# Patient Record
Sex: MALE | Race: BLACK OR AFRICAN AMERICAN | Employment: OTHER | URBAN - NONMETROPOLITAN AREA
[De-identification: names, ages, dates, MRNs, and addresses within clinical notes are randomized per-mention and may not be internally consistent; named-entity substitution may affect disease eponyms.]

---

## 2017-01-24 ENCOUNTER — TELEPHONE (OUTPATIENT)
Dept: PHYSICAL MEDICINE AND REHAB | Age: 42
End: 2017-01-24

## 2020-09-02 ENCOUNTER — HOSPITAL ENCOUNTER (EMERGENCY)
Age: 45
Discharge: HOME | End: 2020-09-02
Attending: EMERGENCY MEDICINE
Payer: MEDICARE

## 2020-09-02 VITALS
RESPIRATION RATE: 16 BRPM | HEART RATE: 80 BPM | DIASTOLIC BLOOD PRESSURE: 93 MMHG | OXYGEN SATURATION: 96 % | SYSTOLIC BLOOD PRESSURE: 140 MMHG

## 2020-09-02 LAB
EKG ATRIAL RATE: 83 BPM
EKG P AXIS: 45 DEGREES
EKG P-R INTERVAL: 178 MS
EKG Q-T INTERVAL: 370 MS
EKG QRS DURATION: 78 MS
EKG QTC CALCULATION (BAZETT): 434 MS
EKG R AXIS: 44 DEGREES
EKG T AXIS: 27 DEGREES
EKG VENTRICULAR RATE: 83 BPM

## 2020-09-02 PROCEDURE — 93005 ELECTROCARDIOGRAM TRACING: CPT | Performed by: EMERGENCY MEDICINE

## 2020-09-02 PROCEDURE — 93010 ELECTROCARDIOGRAM REPORT: CPT | Performed by: INTERNAL MEDICINE

## 2020-09-02 PROCEDURE — 99282 EMERGENCY DEPT VISIT SF MDM: CPT

## 2020-09-02 NOTE — ED PROVIDER NOTES
[facility] EMERGENCY DEPT      CHIEF COMPLAINT       Chief Complaint   Patient presents with    Other     Feelings of being unable to breath or wake up when sleeping       Nurses Notes reviewed and I agree except as noted in the HPI. HISTORY OF PRESENT ILLNESS    Marialuisa Pruitt is a 50 y.o. male who presents complaint of unable to move/feel like he cannot breathe while at the same time laying awake. Patient state that he has had this symptoms since he was a child. He was concerned today because it symptoms lasted longer than usual.  He denies any fever chills, no belly pain, no chest pain. No recent trauma to head neck. Patient state that he has no numbness tingling his face arms legs, no focal weakness either. Nothing seems to particularly make the symptoms worse or better, he thinks that may be drinking prior to going to bed make the symptoms    REVIEW OF SYSTEMS      Review of Systems   Constitutional: Negative for fever, chills, diaphoresis and fatigue. HENT: Negative for congestion, drooling, facial swelling and sore throat. Eyes: Negative for photophobia, pain and discharge. Respiratory: Negative for cough, shortness of breath, wheezing and stridor. Cardiovascular: Negative for chest pain, palpitations and leg swelling. Gastrointestinal: Negative for abdominal pain, blood in stool and abdominal distention. Genitourinary: Negative for dysuria, urgency, hematuria and difficulty urinating. Musculoskeletal: Negative for gait problem, neck pain and neck stiffness. Skin; No rash, No itching  Neurological: Negative for seizures, weakness and numbness. Psychiatric/Behavioral: Negative for hallucinations, confusion and agitation. PAST MEDICAL HISTORY    has a past medical history of Seizure ([facility] [address] SURGICAL HISTORY      has no past surgical history on file.     CURRENT MEDICATIONS       Previous Medications    PHENYTOIN (DILANTIN) 100 MG ER CAPSULE    Take 100 mg by mouth 5 times daily        ALLERGIES     has No Known Allergies. FAMILY HISTORY     He indicated that his mother is . He indicated that his father is . family history includes Diabetes in his mother. SOCIAL HISTORY      reports that he has never smoked. He does not have any smokeless tobacco history on file. He reports current alcohol use of about 2.0 standard drinks of alcohol per week. He reports that he does not use drugs. PHYSICAL EXAM     INITIAL VITALS:  blood pressure is 140/93 (abnormal) and his pulse is 80. His respiration is 16 and oxygen saturation is 96%. Physical Exam   Constitutional:  well-developed and well-nourished. HENT: Head: Normocephalic, atraumatic, Bilateral external ears normal, Oropharynx mosit, No oral exudates, Nose normal.   Eyes: PERRL, EOMI, Conjunctiva normal, No discharge. No scleral icterus  Neck: Normal range of motion, No tenderness, Supple    Cardiovascular: Normal rate, regular rhythm, S1 normal and S2 normal.  Exam reveals no gallop. Pulmonary/Chest: Effort normal and breath sounds normal. No accessory muscle usage or stridor. No respiratory distress. no wheezes. has no rales. exhibits no tenderness. Abdominal: Soft. Bowel sounds are normal.  exhibits no distension. There is no tenderness. There is no rebound and no guarding. Extremities: No edema, no tenderness, no cyanosis, no clubbing. Musculoskeletal: Good range of motion in major joints is observed. No major deformities noted. Neurological: Alert and oriented ×3, normal motor function, normal sensory function, no focal deficits. GCS  15  Skin: Skin is warm, dry and intact. No rash noted. No erythema.    Psychiatric: Affect normal, judgment normal, mood normal.  DIFFERENTIAL DIAGNOSIS:       DIAGNOSTIC RESULTS     EKG: All EKG's are interpreted by the Emergency Department Physician who either signs or Co-signs this chart in the absence of a cardiologist.      RADIOLOGY: non-plain film images(s) such as CT, Ultrasound and MRI are read by the radiologist.  Plain radiographic images are visualized and preliminarily interpreted by the emergency physician unless otherwise stated below. LABS:   Labs Reviewed - No data to display    EMERGENCY DEPARTMENT COURSE:   Vitals:    Vitals:    09/02/20 0704 09/02/20 0713   BP:  (!) 140/93   Pulse: 77 80   Resp:  16   TempSrc: Oral    SpO2: 97% 96%     Patient presenting with complaint of sleep-related paralysis, chronic in nature. Patient advised to avoid excessive alcohol intake prior to going to bed, use of sleep medicine. Otherwise, he can follow-up with primary care physician. CRITICAL CARE:       CONSULTS:  None    PROCEDURES:  None    FINAL IMPRESSION      1. Sleep paralysis          DISPOSITION/PLAN       PATIENT REFERRED TO:  No follow-up provider specified.     DISCHARGE MEDICATIONS:  New Prescriptions    No medications on file       (Please note that portions of this note were completed with a voice recognition program.  Efforts were made to edit the dictations but occasionally words are mis-transcribed.)    Lisa Meadows, [address] [place] [address],   09/02/20 0377

## 2021-02-19 NOTE — PROGRESS NOTES
[facility] [address] initial consultation note    Ida Morel                                                Chief complaint: Ida Morel is a 48 y. o.oldmale came for further evaluation regarding his ?sleep apnea  with referral from MOMO Myers - NP. [place]:    Sleep/Wake schedule:  Usual time to go to bed during the work/regular day of week: {NUMBERS 1-12:10}:{HPI TIME MINUTES :20212} {AM/PM:160067966}. Usual time to wake up during the work//regular day of week: {NUMBERS 1-12:10}:{HPI TIME MINUTES :20212} {YY/ST:509429871}. Over the weekends his sleep schedule: [] Remain same. []phase delayed. He usually falls a sleep in less than: {NUMBERS BY 5:43950} minutes. He takes naps: {YES/NO:19726}. Number of naps per week:  {Numbers; 1-10:57654} times. During each nap he spends a total of: {NUMBERS 0-10:85211}hours/   {NUMBERS BY 5:70888}  Minutes. The naps were reported as refreshing: {YES/NO:19726}. Sleep Hygiene:    Is the temperature and evironment in his bed room is acceptable to him: Yes. He watches Television in his bed room: {YES/NO:19726}. He read books, study, pay bills etc in the bed: {YES/NO:19726}. Frequency He wake up during night/sleep: {Numbers; 1-10:26528}  Majority of nocturnal awakenings are for urination: {YES/NO:19726}. Difficulty in falling back to sleep after nocturnal awakenings: {YES/NO:19726}  . Do you drink coffee: {YES/NO:19726}. {NUMBERS 0-10:76952} cup/s per day. Do you drink caffeinated beverages i.e sodas: {YES/NO:19726}. {NUMBERS 0-10:67662} can/s per day. Do you drink tea:{YES/NO:19726}. {NUMBERS 0-10:60593} cup/s/glasse/s per day. Do you drink alcoholic beverages: {FKJ/CN:87061}. {NUMBERS 0-10:88904} drink/s per day. History of recreational drug use: {YES/NO:19726}. History of tobacco smoking:{YES/NO:19726}. Amount of tobacco smoking: {NUMBERS; 0.25-3 BY 0.25:59524} PPD. Years of tobacco smoking: {Numbers; 0-100:57246}. Quit smoking: {YES/NO:19726}. Quit year:{YES/NO:19726}. Current smoker: {YES/NO:19726}. Amount of current tobacco smoking: {NUMBERS; 0.25-3 BY 0.25:62588} PPD      Sleep apnea symptoms:  Noticed to have loud snoring:{YES/NO:19726}. Noted by his family member- {Persons; family members:607551}  Witnessed apneas during sleep noticed: {YES/NO:19726}. Noted by his family member- {Persons; family members:549527}. History of choking and gasping sensation at night time: {YES/NO:19726}. History of headaches in the morning:{YES/NO:19726}. History of dry mouth in the morning: {YES/NO:19726}. History of palpitations during night time/nocturnal awakenings: {YES/NO:19726}. History of sweating during night time/nocturnal awakenings: {YES/NO:19726}    General:  History of head injury in the past: {YES/NO:19726}. []  Due to MVA  [] Not due to MVA. Associated loss of consciousness with head injury: {YES/NO:19726}. History of seizures: {YES/NO:19726}. Rest less legs syndrome symptoms:NO  History suggestive of periodic limb movements during sleep: NO  History suggestive of hypnagogic hallucinations: NO  History suggestive of hypnopompic hallucinations: NO  History suggestive of sleep talking:NO  History suggestive of sleep walking:NO  History suggestive of bruxism: {YES/NO:19726}. [] No mouth guard. [] Uses mouth guard. History suggestive of cataplexy: NO  History suggestive of sleep paralysis: NO    Family history of sleep disorders:  Family history of obstructive sleep apnea: {YES/NO:19726}. Family member diagnosed with obstructive sleep apnea {Persons; family members:725489}. Family member using PAP therapy:  {YES/NO:19726}. Family history of Narcolepsy: {YES/NO:19726}. Family member diagnosed with Narcolepsy {Persons; family members:287331}. Family history of Rest less legs syndrome : {YES/NO:19726}. Family member diagnosed with Restless legs syndrome {Persons; family members:710016}. History regarding old sleep studies:  Prior history of sleep study: {YES/NO:19726}. Using CPAP device: {YES/NO:19726}. Currently using home Oxygen: NO.    {NUMBERS 0-10:89239}LPM.    Patient considerations:  Is the patient is ambulatory: Yes  Patient is currently using: None of these Wheelchair, Lonita Teto or [employer]. Para/Quadriplegic: NO  Hearing deficit : NO  Claustrophobic: NO  MDD : NO  Blind: NO  Incontinent: NO  Para/Quadraplegi: NO.   Need transportation to and from Sleep Center:NO      Social History:  Social History     Tobacco Use    Smoking status: Never Smoker   Substance Use Topics    Alcohol use: Yes     Alcohol/week: 2.0 standard drinks     Types: 2 Cans of beer per week    Drug use: No   .  He is currently working: {YES/NO:19726}. [] Retired. []Disabled     He usually goes to his work at:  {NUMBERS 1-12:10}:{HPI TIME MINUTES :20212}{AM/PM:605601875}. He completes his work at:  {NUMBERS 1-12:10}:{HPI TIME MINUTES :20212}{AM/PM:430997466}. Past Medical History:   Diagnosis Date    Seizure [facility])        No past surgical history on file. No Known Allergies    Current Outpatient Medications   Medication Sig Dispense Refill    phenytoin (DILANTIN) 100 MG ER capsule Take 100 mg by mouth 5 times daily        No current facility-administered medications for this visit. Family History   Problem Relation Age of Onset    Diabetes Mother         Review of Systems:   General/Constitutional: No recent loss of weight or appetite changes. No fever or chills. HENT: Negative. Eyes: Negative. Upper respiratory tract: No nasal stuffiness or post nasal drip. Lower respiratory tract/ lungs: No cough or sputum production. No hemoptysis. Cardiovascular: No palpitations or chest pain. Gastrointestinal: No nausea or vomiting. Neurological: No focal neurologiacal weakness. Extremities: No edema. Musculoskeletal: No complaints. Genitourinary: No complaints. Hematological: Negative. Psychiatric/Behavioral: Negative. Skin: No itching. There were no vitals taken for this visit. Mallampati Score: {NUMBERS 1-4:13359}   Neck Circumference:{NUMBERS 0-31:12768}. {NUMBERS; 0-9:49657} Inches. His [place] sleepiness score given today was : {NUMBERS 0-31:13377}      Physical Exam   Nursing note and vitals reviewed. Constitutional: Patient appears moderately built and moderately nourished. No distress. Patient is oriented to person, place, and time. HENT:   Head: Normocephalic and atraumatic. Right Ear: External ear normal.   Left Ear: External ear normal.   Mouth/Throat: Oropharynx is clear and moist.  No oral thrush. Eyes: Conjunctivae are normal. Pupils are equal, round, and reactive to light. No scleral icterus. Neck: Neck supple. No JVD present. No tracheal deviation present. Cardiovascular: Normal rate, regular rhythm, normal heart sounds. No murmur heard. Pulmonary/Chest: Effort normal and breath sounds normal. No stridor. No respiratory distress. No wheezes. No rales. Patient exhibits no tenderness. Abdominal: Soft. Patient exhibits no distension. No tenderness. Musculoskeletal: Normal range of motion. Extremities: Patient exhibits no edema and no tenderness. Lymphadenopathy:  No cervical adenopathy. Neurological: Patient is alert and oriented to person, place, and time. Skin: Skin is warm and dry. Patient is not diaphoretic. Psychiatric: Patient  has a normal mood and affect. Patient behavior is normal.     Diagnostic Data:  None related sleep. Assessment:  -Snoring {Desc; with/without:5700} witnessed apneas,frequent nocturnal awakenings and excessive daytime sleepiness to evaluate for obstructive sleep apnea. -Inadequate sleep hygiene. Past Medical History:   Diagnosis Date    [facility]izure [facility])        Recommendations/Plan:  -Will schedule patient for polysomnogram in the sleep lab. -I had a discussion with patient regarding avialable treatment options for his sleep disorder breathing including but not limited to CPAP titration in the sleep lab Vs.Dental appliance placement with referral to a local dentist Vs other available surgical options including Uvulopalatopharyngoplasty, maxillomandibular ostomy and tracheostomy as last option. At the end of discussion, he is not decided on his   treatment if he found to have obstructive sleep apnea at this time.  -We will see David Douglas back in 1week after the sleep study to go over the sleep study results and further management options.  -He was educated to practice good sleep hygiene practices. He  was provided with a good sleep hygiene hand out.  -Serina Rodriguez was advised to make earlier appointment with my clinic if he develops any worsening of sleep symptoms. He verbalizes understanding.  -Serina Rodriguez was advised to not to drive any motor vehicles or operate heavy equipment until his sleep symptoms are under good control. David Douglas verbalizes understanding.  -He was advised to loose weight by controlling diet and doing exercise once cleared by his family physician. - David Douglas was educated about my impression and plan. He verbalizes understanding.      -I personally reviewed updated the Past medical hx, Past surgical hx,Social hx, Family hx, Medications, Allergies in the discrete data section of the patient chart along with labs, Pulmonary medicine,Sleep medicine related, Pathological, Microbiological and Radiological investigations.

## 2021-02-22 ENCOUNTER — INITIAL CONSULT (OUTPATIENT)
Dept: PULMONOLOGY | Age: 46
End: 2021-02-22
Payer: MEDICARE

## 2021-02-22 VITALS
DIASTOLIC BLOOD PRESSURE: 78 MMHG | WEIGHT: 225.4 LBS | TEMPERATURE: 97.3 F | OXYGEN SATURATION: 98 % | HEART RATE: 69 BPM | HEIGHT: 73 IN | BODY MASS INDEX: 29.87 KG/M2 | SYSTOLIC BLOOD PRESSURE: 124 MMHG

## 2021-02-22 DIAGNOSIS — R06.83 LOUD SNORING: Primary | ICD-10-CM

## 2021-02-22 DIAGNOSIS — R06.81 APNEA: ICD-10-CM

## 2021-02-22 DIAGNOSIS — R40.0 DAYTIME SOMNOLENCE: ICD-10-CM

## 2021-02-22 DIAGNOSIS — G47.10 HYPERSOMNIA: ICD-10-CM

## 2021-02-22 DIAGNOSIS — R56.9 SEIZURE (HCC): ICD-10-CM

## 2021-02-22 PROCEDURE — G8484 FLU IMMUNIZE NO ADMIN: HCPCS | Performed by: INTERNAL MEDICINE

## 2021-02-22 PROCEDURE — G8427 DOCREV CUR MEDS BY ELIG CLIN: HCPCS | Performed by: INTERNAL MEDICINE

## 2021-02-22 PROCEDURE — G8419 CALC BMI OUT NRM PARAM NOF/U: HCPCS | Performed by: INTERNAL MEDICINE

## 2021-02-22 PROCEDURE — 99204 OFFICE O/P NEW MOD 45 MIN: CPT | Performed by: INTERNAL MEDICINE

## 2021-02-22 PROCEDURE — 1036F TOBACCO NON-USER: CPT | Performed by: INTERNAL MEDICINE

## 2021-02-22 NOTE — PROGRESS NOTES
[facility] [address] [facility] initial consultation note    Karin Mcelroy                                                Chief complaint: Karin Mcelroy is a 48 y. o.oldmale came for further evaluation regarding his sleep apnea  with referral from MOMO [place] - [place]. [place]:    Sleep/Wake schedule:  Usual time to go to bed during the work/regular day of week: 11:00 PM.  Usual time to wake up during the work//regular day of week: 8:00 AM.  Over the weekends his sleep schedule: [] Remain same. [x]phase delayed. He usually falls a sleep in less than: 5 minutes. He takes naps: Yes. Number of naps per week:  2 times. During each nap he spends a total of: 5-10 minutes  The naps were reported as refreshing: Yes. Sleep Hygiene:    Is the temperature and evironment in his bed room is acceptable to him: Yes. He watches Television in his bed room: Yes. He read books, study, pay bills etc in the bed: Yes. Frequency He wake up during night/sleep: 0  Majority of nocturnal awakenings are for urination:   Difficulty in falling back to sleep after nocturnal awakenings: No  .  Do you drink coffee: No.  cup/s per day. Do you drink caffeinated beverages i.e sodas: Yes. 1 can/s per day. Do you drink tea:Yes. 1 cup/s/glasse/s per day. Do you drink alcoholic beverages: Yes. 3 drink/s per day a few times a week     History of recreational drug use: No.     History of tobacco smoking:No.      Sleep apnea symptoms:  Noticed to have loud snoring:Yes. Noted by his family members  Witnessed apneas during sleep noticed: Yes. Noted by his family members. History of choking and gasping sensation at night time: Yes.   History of headaches in the morning:No.  History of dry mouth in the morning: No.  History of palpitations during night time/nocturnal awakenings: No.  History of sweating during night time/nocturnal awakenings: Yes    General: No current facility-administered medications for this visit. Family History   Problem Relation Age of Onset    Diabetes Mother         Review of Systems:   General/Constitutional: No recent loss of weight or appetite changes. No fever or chills. HENT: Negative. Eyes: Negative. Upper respiratory tract: No nasal stuffiness or post nasal drip. Lower respiratory tract/ lungs: No cough or sputum production. No hemoptysis. Cardiovascular: No palpitations or chest pain. Gastrointestinal: No nausea or vomiting. Neurological: No focal neurologiacal weakness. Extremities: No edema. Musculoskeletal: No complaints. Genitourinary: No complaints. Hematological: Negative. Psychiatric/Behavioral: Negative. Skin: No itching. /78 (Site: Right Upper Arm, Position: Sitting, Cuff Size: Medium Adult)   Pulse 69   Temp 97.3 °F (36.3 °C) (Temporal)   Ht 6' 1\" (1.854 m)   Wt 225 lb 6.4 oz (102.2 kg)   SpO2 98% Comment: Room air at rest  BMI 29.74 kg/m²   Mallampati Score: 3  Neck Circumference:  16.75 Inches. His [place] sleepiness score given today was: 13    Physical Exam   Nursing note and vitals reviewed. Constitutional: Patient appears moderately built and moderately nourished. No distress. Patient is oriented to person, place, and time. HENT:   Head: Normocephalic and atraumatic. Right Ear: External ear normal.   Left Ear: External ear normal.   Mouth/Throat: Oropharynx is clear and moist.  No oral thrush. Eyes: Conjunctivae are normal. Pupils are equal, round, and reactive to light. No scleral icterus. Neck: Neck supple. No JVD present. No tracheal deviation present. Cardiovascular: Normal rate, regular rhythm, normal heart sounds. No murmur heard. Pulmonary/Chest: Effort normal and breath sounds normal. No stridor. No respiratory distress. No wheezes. No rales. Patient exhibits no tenderness. Abdominal: Soft. Patient exhibits no distension. No tenderness. Musculoskeletal: Normal range of motion. Extremities: Patient exhibits no edema and no tenderness. Lymphadenopathy:  No cervical adenopathy. Neurological: Patient is alert and oriented to person, place, and time. Skin: Skin is warm and dry. Patient is not diaphoretic. Psychiatric: Patient  has a normal mood and affect. Patient behavior is normal.     Diagnostic Data:  None related sleep. Assessment:  -Snoring with witnessed apneas,frequent nocturnal awakenings and excessive daytime sleepiness to evaluate for obstructive sleep apnea. -Inadequate sleep hygiene. Past Medical History:   Diagnosis Date    Seizure [facility])        Recommendations/Plan:  -Will schedule patient for polysomnogram in the sleep lab. -I had a discussion with patient regarding avialable treatment options for his sleep disorder breathing including but not limited to CPAP titration in the sleep lab Vs.Dental appliance placement with referral to a local dentist Vs other available surgical options including Uvulopalatopharyngoplasty, maxillomandibular ostomy and tracheostomy as last option. At the end of discussion, he is not decided on his   treatment if he found to have obstructive sleep apnea at this time.  -We will see Kirstin Alas back in 1week after the sleep study to go over the sleep study results and further management options.  -He was educated to practice good sleep hygiene practices. He  was provided with a good sleep hygiene hand out.  -Duong Simpson was advised to make earlier appointment with my clinic if he develops any worsening of sleep symptoms. He verbalizes understanding.  -Duong Simpson was advised to not to drive any motor vehicles or operate heavy equipment until his sleep symptoms are under good control. Kirstin Alas verbalizes understanding.  -He was advised to loose weight by controlling diet and doing exercise once cleared by his family physician. - Malina Wade was educated about my impression and plan. He verbalizes understanding.      -I personally reviewed updated the Past medical hx, Past surgical hx,Social hx, Family hx, Medications, Allergies in the discrete data section of the patient chart along with labs, Pulmonary medicine,Sleep medicine related, Pathological, Microbiological and Radiological investigations.      Electronically signed by Lorin Regalado MD on 2/22/2021 at 9:58 AM

## 2021-02-22 NOTE — PATIENT INSTRUCTIONS
-Will schedule patient for polysomnogram in the sleep lab. -I had a discussion with patient regarding avialable treatment options for his sleep disorder breathing including but not limited to CPAP titration in the sleep lab Vs.Dental appliance placement with referral to a local dentist Vs other available surgical options including Uvulopalatopharyngoplasty, maxillomandibular ostomy and tracheostomy as last option. At the end of discussion, he is not decided on his   treatment if he found to have obstructive sleep apnea at this time.  -We will see Savannah Lima back in 1week after the sleep study to go over the sleep study results and further management options.  -He was educated to practice good sleep hygiene practices. He  was provided with a good sleep hygiene hand out.  -Davi Bocanegra was advised to make earlier appointment with my clinic if he develops any worsening of sleep symptoms. He verbalizes understanding.  -Ludinpasha Bocanegra was advised to not to drive any motor vehicles or operate heavy equipment until his sleep symptoms are under good control. Savannah Lima verbalizes understanding.  -He was advised to loose weight by controlling diet and doing exercise once cleared by his family physician. - Savannah Lima was educated about my impression and plan. He verbalizes understanding.

## 2021-02-22 NOTE — PROGRESS NOTES
Chief Complaint:  Johnny Taveras is here today for a new sleep consult. Mallampati airway Class:  2  Neck Circumference:  16.75 Inches    [place] sleepiness score 2/22/21:  ***.   SAQLI: ***

## 2021-03-15 ENCOUNTER — HOSPITAL ENCOUNTER (OUTPATIENT)
Dept: SLEEP CENTER | Age: 46
Discharge: HOME | End: 2021-03-17
Payer: MEDICARE

## 2021-03-15 DIAGNOSIS — R56.9 SEIZURE (HCC): ICD-10-CM

## 2021-03-15 DIAGNOSIS — G47.10 HYPERSOMNIA: ICD-10-CM

## 2021-03-15 DIAGNOSIS — R06.81 APNEA: ICD-10-CM

## 2021-03-15 DIAGNOSIS — R40.0 DAYTIME SOMNOLENCE: ICD-10-CM

## 2021-03-15 DIAGNOSIS — R06.83 LOUD SNORING: ICD-10-CM

## 2021-03-15 PROCEDURE — 95810 POLYSOM 6/> YRS 4/> PARAM: CPT

## 2021-03-16 LAB — STATUS: NORMAL

## 2021-03-17 NOTE — PROGRESS NOTES
[address] [place] [address]                               SLEEP STUDY REPORT    PATIENT NAME: Alexander Jaramillo                  :        1975  MED REC NO:   [id number]                           ROOM:  ACCOUNT NO:   [de-identified]                           ADMIT DATE: 03/15/2021  PROVIDER:     Marcial Johnson MD    DATE OF STUDY:  03/15/2021    REFERRING PROVIDER:  Neris Chowdary CNP    The patient's height is 73 inches, weight is 225 pounds with a BMI of  29.7. HISTORY:  The patient is a 78-year-old gentleman who was initially  evaluated by me on 2021. The patient is currently suffering from  hypersomnia with [place] Sleepiness Score of 13. The patient gave a  history of snoring and witnessed apneas. The patient is also having  frequent nocturnal awakenings. The patient was scheduled for sleep  study to evaluate for sleep apnea as an etiology for hypersomnia. METHODS:  The patient underwent digital polysomnography in compliance  with the standards and specifications from the AASM Manual including the  simultaneous recording of 3 EEG channels (F4-M1, C4-M1, and O2-M1 with  back up electrodes F3-M2, C3-M2, and O1-M2), 2 EOG channels (E1-M2, and  E2-M1,), EMG (chin, left & right leg), EKG, Nonin pulse oximetry with  less than 2 second averaging time, body position, airflow recorded by  oral-nasal thermal sensor and nasal air pressure transducer, plus  respiratory effort recorded by calibrated respiratory inductance  plethysmography (RIP), flow volume loop, sound and video. Sleep staging  and scoring followed the standard put forth by the American Academy of  Sleep Medicine and utilized the 4A obstructive hypopnea event  desaturation of 4 percent or greater. INTERPRETATION:  This is a baseline sleep study, and the study was  performed on 03/15/2021.   The study was started at 09:29 p.m. and was  terminated at 05:00 a.m. with a total recording time of 450.9 minutes,  sleep period time was 429.9 minutes, and total sleep time was 405.5  minutes. Overall sleep efficiency was 89.9%. The sleep onset latency  was 21 minutes, and wake after sleep onset was 24.4 minutes, and REM  sleep latency was 128 minutes. SLEEP STAGING AND DISTRIBUTION SUMMARY:  Revealed the patient spent 72  minutes in stage I consisting of 17.8%, 253.5 minutes in stage II  consisting of 62.5%, 11.5 minutes in stage III consisting of 2.8%, 68.5  minutes in REM sleep consisting of 16.9% of total sleep time. RESPIRATORY EVENT ANALYSIS:  Revealed the patient had a total of 23  apneas, all of them were obstructive in nature. The patient also had a  total of 93 hypopneas, all of them were obstructive in nature. The  total number of apneas and hypopneas recorded during the study was 116  with an apnea-hypopnea index of 17.2. The patient's REM sleep  apnea-hypopnea index was 18.4. POSITION ANALYSIS:  Revealed the patient spent 148.7 minutes in supine  position and 256.8 minutes in right lateral position with a supine  apnea-hypopnea index of 39.1 whereas right lateral position  apnea-hypopnea index was 4.2. PERIODIC LIMB MOVEMENT ANALYSIS:  Revealed the patient did not have any  periodic limb movements. The patient had a total of 104 spontaneous  arousals with a spontaneous arousal index of 15.4. OXYGEN SATURATION MONITORING:  Revealed the patient had a maximum oxygen  desaturation to 81% with a mean oxygen saturation of 94.6%. The patient  spent a total of 1.6 minutes below oxygen saturation less than 88%. The patient was found to have mild to loud snoring during the sleep  study. EKG MONITORING:  Revealed normal sinus rhythm with occasional premature  ventricular contractions. IMPRESSION:  1. Moderately severe obstructive sleep apnea with worsening of  respiratory events in REM sleep and also in supine position.   2.  Decreased amount of REM sleep. 3.  Hypersomnia, most likely due to sleep apnea. RECOMMENDATIONS:  The patient should be scheduled for followup in my  clinic as soon as possible to discuss about the sleep study findings for  further management. Thanks to [employer], CNP, for giving me this opportunity to  participate in the care of this pleasant gentleman.         Bonnie Cool MD    D: 03/16/2021 18:36:49       T: 03/17/2021 2:40:03     [email or web address]  Job#: [id number]     Doc#: [id number]    CC:

## 2021-03-29 ENCOUNTER — OFFICE VISIT (OUTPATIENT)
Dept: PULMONOLOGY | Age: 46
End: 2021-03-29
Payer: MEDICARE

## 2021-03-29 VITALS
WEIGHT: 223.8 LBS | OXYGEN SATURATION: 98 % | HEIGHT: 73 IN | TEMPERATURE: 96.5 F | HEART RATE: 75 BPM | BODY MASS INDEX: 29.66 KG/M2 | DIASTOLIC BLOOD PRESSURE: 74 MMHG | SYSTOLIC BLOOD PRESSURE: 126 MMHG

## 2021-03-29 DIAGNOSIS — G47.33 OSA (OBSTRUCTIVE SLEEP APNEA): Primary | ICD-10-CM

## 2021-03-29 DIAGNOSIS — G47.8 SLEEP PARALYSIS: ICD-10-CM

## 2021-03-29 DIAGNOSIS — R40.0 DAYTIME SOMNOLENCE: ICD-10-CM

## 2021-03-29 DIAGNOSIS — R06.83 LOUD SNORING: ICD-10-CM

## 2021-03-29 DIAGNOSIS — G47.10 HYPERSOMNIA: ICD-10-CM

## 2021-03-29 PROCEDURE — G8419 CALC BMI OUT NRM PARAM NOF/U: HCPCS | Performed by: PHYSICIAN ASSISTANT

## 2021-03-29 PROCEDURE — 1036F TOBACCO NON-USER: CPT | Performed by: PHYSICIAN ASSISTANT

## 2021-03-29 PROCEDURE — G8484 FLU IMMUNIZE NO ADMIN: HCPCS | Performed by: PHYSICIAN ASSISTANT

## 2021-03-29 PROCEDURE — 99214 OFFICE O/P EST MOD 30 MIN: CPT | Performed by: PHYSICIAN ASSISTANT

## 2021-03-29 PROCEDURE — G8427 DOCREV CUR MEDS BY ELIG CLIN: HCPCS | Performed by: PHYSICIAN ASSISTANT

## 2021-03-29 ASSESSMENT — ENCOUNTER SYMPTOMS
DIARRHEA: 0
BACK PAIN: 0
EYES NEGATIVE: 1
ALLERGIC/IMMUNOLOGIC NEGATIVE: 1
STRIDOR: 0
WHEEZING: 0
NAUSEA: 0
COUGH: 0
CHEST TIGHTNESS: 0
SHORTNESS OF BREATH: 0

## 2021-03-29 NOTE — PROGRESS NOTES
[facility]    Tanvi Ramirez, 48 y.o.  [id number]      Pt of [facility]  Nurses Notes   Lizette Hollins is here today for a PSG follow up. Study Results  Initial Study Date -  3/15/2021  AHI -  17.2    TotalEvents - 116  (Apneas  23  Hypopneas 93  Central  0)  LM w/Arousals - 0  Sleep Efficiency - 89.9 % (Total Sleep Time - 405.5 min)  Time with Sats below 88% - 1.6 min  Interval History       Tanvi Ramirez is a 48 y.o. old male who comes in to review the results of his recent sleep study, to answer questions and to explore options for treatment. he continues to have symptoms of snoring, excessive daytime sleepiness, take naps during the day    PMHx  Past Medical History:   Diagnosis Date    Seizure [facility])      No past surgical history on file. Social History     Tobacco Use    Smoking status: Never Smoker    Smokeless tobacco: Never Used   Substance Use Topics    Alcohol use: Yes     Alcohol/week: 2.0 standard drinks     Types: 2 Cans of beer per week    Drug use: No     Family History   Problem Relation Age of Onset    Diabetes Mother      Allergies  No Known Allergies  Meds  Current Outpatient Medications   Medication Sig Dispense Refill    phenytoin (DILANTIN) 100 MG ER capsule Take 100 mg by mouth 5 times daily        No current facility-administered medications for this visit. ROS  Review of Systems   Constitutional: Negative for activity change, appetite change, chills and fever. HENT: Negative for congestion and postnasal drip. Eyes: Negative. Respiratory: Negative for cough, chest tightness, shortness of breath, wheezing and stridor. Cardiovascular: Negative for chest pain and leg swelling. Gastrointestinal: Negative for diarrhea and nausea. Endocrine: Negative. Genitourinary: Negative. Musculoskeletal: Negative. Negative for arthralgias and back pain. Skin: Negative. Allergic/Immunologic: Negative. Neurological: Negative. Negative for dizziness and light-headedness. Psychiatric/Behavioral: Negative. All other systems reviewed and are negative. Exam  Vitals -  /74 (Site: Left Upper Arm, Position: Sitting, Cuff Size: Medium Adult)   Pulse 75   Temp 96.5 °F (35.8 °C) (Temporal)   Ht 6' 1\" (1.854 m)   Wt 223 lb 12.8 oz (101.5 kg)   SpO2 98% Comment: Room air at rest  BMI 29.53 kg/m²    Body mass index is 29.53 kg/m². Oxygen level - Room Air  Physical Exam  Constitutional:       Appearance: He is well-developed. HENT:      Head: Normocephalic and atraumatic. Right Ear: External ear normal.      Left Ear: External ear normal.   Eyes:      Conjunctiva/sclera: Conjunctivae normal.      Pupils: Pupils are equal, round, and reactive to light. Neck:      Musculoskeletal: Normal range of motion and neck supple. Cardiovascular:      Rate and Rhythm: Normal rate and regular rhythm. Heart sounds: Normal heart sounds. Pulmonary:      Effort: Pulmonary effort is normal.      Breath sounds: Normal breath sounds. Musculoskeletal: Normal range of motion. Skin:     General: Skin is warm and dry. Neurological:      Mental Status: He is alert and oriented to person, place, and time. Psychiatric:         Behavior: Behavior normal.         Thought Content: Thought content normal.         Judgment: Judgment normal.        Assessment   Diagnosis Orders   1. CAITY (obstructive sleep apnea)  Sleep Study with PAP Titration   2. Loud snoring     3. Daytime somnolence     4. Hypersomnia     5. Sleep paralysis        Recommendations  PSG positive for sleep apnea  Several options for treatment were discussed including positional therapy, OAT and CPAP. The benefits and limitations of each were discussed. After addressing his  concerns, Davi Bocanegra  decided to move ahead with a CPAP titration. Davi Bocanegra  also was interested trying out some masks before the study.      Schedule for a CPAP Titration   Mask desensitization Follow up 6-8 weeks after PAP set up        Kassandra Cruz PA-C, MPAS  3/29/2021

## 2021-05-05 ENCOUNTER — HOSPITAL ENCOUNTER (OUTPATIENT)
Dept: SLEEP CENTER | Age: 46
Discharge: HOME | End: 2021-05-07
Payer: MEDICARE

## 2021-05-05 DIAGNOSIS — G47.33 OSA (OBSTRUCTIVE SLEEP APNEA): ICD-10-CM

## 2021-05-05 PROCEDURE — 95811 POLYSOM 6/>YRS CPAP 4/> PARM: CPT

## 2021-05-06 DIAGNOSIS — G47.10 HYPERSOMNIA: ICD-10-CM

## 2021-05-06 DIAGNOSIS — G47.33 OSA (OBSTRUCTIVE SLEEP APNEA): Primary | ICD-10-CM

## 2021-05-06 DIAGNOSIS — Z99.89 OSA ON CPAP: ICD-10-CM

## 2021-05-06 DIAGNOSIS — G47.33 OSA ON CPAP: ICD-10-CM

## 2021-05-06 LAB — STATUS: NORMAL

## 2021-05-06 NOTE — PROGRESS NOTES
Micaela Feldmanvaldez doesn't have a preference of DME at this time. Title:  CPAP/BiLevel Titration's    Approved by:  Merlin Cabot MD      Approval Date:  December, 2019 Next Review:  December, 2021     Responsible Party:  [facility]idel[facility]entes Institution/Entities Applies to:   [place] Number:  None    Document Type:  Such as Guideline, Policy, Policy & Procedure, or Procedure, Instructions  Manual:  Policy and Procedures    Section: IV Policy Start Date: October, 1277           POLICY: Positive airway pressure device is used to treat patients with sleep related breathing disorders characterized by full or partial occlusion of the upper airway during sleep. A patient must have undergone polysomnography and diagnosed with obstructive sleep apnea. All individuals who record sleep studies must follow best practices for CPAP/bilevel/ASV titrations in order to attain the ideal pressure setting for their patients. Too low of pressure may cause patients to either be sub-optimally treated or to wake up in a panic. Too much pressure may cause the patient to experience bloating or mask leakage. Determining the appropriate pressure setting for each patient will lead to improved adherence and outcome. Titrations are not an exact science, and it is understood that technologists may need to make minor changes for individual patients. The procedures outlined below are meant to be a guideline and follow the spirit of the AASM clinical guidelines. PROCEDURE:    CPAP:    1. Review the patients pertinent medical al history, previous sleep study or studies to ass the severity of sleep disordered breathing. Review of pertinent information will help to attain a better titration.    2. Applications of electrodes, montages, filters, sensitivities and scoring will be performed according to the current version of the AASM Scoring Manual.   3. Prior to initiating study collect all breathing despite use of a full-face mask/chin strap  b. Inability to exhale against higher expiratory pressures (typically beginning anywhere from 15 to 20 cm of H20.  c. Has frequent central apneas, the use of bilevel positive airway pressure may be indicated. Document directly on study why the patient is being switched from CPAP to bilevel. 12. Ensure that supine sleep has been seen on the chosen setting. Going above the chosen setting 1 or 2 cm H20 to show range may be helpful to ensure that the correct pressure has been established. BiLEVEL:    1. Technologist may change from CPAP to bilevel during a study if proven the patient is unable to tolerate CPAP. 2. Review the patients pertinent medical al history, previous sleep study or studies to ass the severity of sleep disordered breathing. Review of pertinent information will help to attain a better titration. 3. Applications of electrodes, montages, filters, sensitivities and scoring will be performed according to the current version of the AASM Scoring Manual.   4. Prior to initiating study collect all appropriate PAP supplies  a. Tubing   b. Humidifier (filled with distilled water)  c. Masks   5. The technologist should assess and measure patient for most appropriate mask prior to start of study. 6. If the patient has not previously been on CPAP, beginning pressures should be 8/4 cm H20 or higher if patient is morbidly obese or unable to fall asleep at lower pressures. If the patient has been previously successfully treated on CPAP start expiratory pressure at therapeutic setting and set inspiratory pressure 4 cm H20 higher. If advancing from CPAP to bilevel during a study expiratory pressure should be set at most successful CPAP setting and inspiratory pressure set 4 cm h20 higher. 7. The standard differential pressure utilized during bilevel titrations typically ranges from 3 to 5 cm H20, with 4 cm H20 being the most common.   Higher differential

## 2021-05-07 ENCOUNTER — TELEPHONE (OUTPATIENT)
Dept: SLEEP CENTER | Age: 46
End: 2021-05-07

## 2021-05-07 NOTE — PROGRESS NOTES
[address] [place] [address]                               SLEEP STUDY REPORT    PATIENT NAME: Inga Hall                  :        1975  MED REC NO:   [id number]                           ROOM:  ACCOUNT NO:   [de-identified]                           ADMIT DATE: 2021  PROVIDER:     Adenike Jones. MD Elizabeth    DATE OF STUDY:  2021    CPAP TITRATION STUDY REPORT    REFERRING PROVIDER:  Cassandra Lau PA-C    The patient's height is 73 inches, weight is 223 pounds with a BMI of  29.4. HISTORY:  The patient is a 75-year-old gentleman who underwent initial  baseline sleep study on 03/15/2021. The patient was diagnosed with  moderately severe obstructive sleep apnea with worsening of respiratory  events during REM sleep. The patient had associated comorbidities  including hypersomnia. The patient was scheduled for CPAP titration as  a treatment. METHODS:  The patient underwent digital polysomnography in compliance  with the standards and specifications from the AASM Manual including the  simultaneous recording of 3 EEG channels (F4-M1, C4-M1, and O2-M1 with  back up electrodes F3-M2, C3-M2, and O1-M2), 2 EOG channels (E1-M2, and  E2-M1,), EMG (chin, left & right leg), EKG, Nonin pulse oximetry with  less than 2 second averaging time, body position, airflow recorded by  oral-nasal thermal sensor and nasal air pressure transducer, plus  respiratory effort recorded by calibrated respiratory inductance  plethysmography (RIP), flow volume loop, sound and video. Sleep staging  and scoring followed the standard put forth by the American Academy of  Sleep Medicine and utilized the 4A obstructive hypopnea event  desaturation of 4 percent or greater. INTERPRETATION:  This is a CPAP titration study, and the study was  performed on 2021.   The study was started at 09:00 p.m. and was  terminated at 05:09 a.m. with a total recording time of 488.6 minutes,  sleep period time was 432.1 minutes and total sleep time was 400.5  minutes. Overall sleep efficiency was 82%. The sleep onset latency was  56.4 minutes, wake after sleep onset was 31.6 minutes and REM sleep  latency was 39 minutes. SLEEP STAGING AND DISTRIBUTION SUMMARY:  Revealed the patient spent 57  minutes in stage I consisting of 14.2%, 249.5 minutes in stage II  consisting of 62.3%, 94 minutes in REM sleep consisting of 23.5% of  total sleep time. The patient had absent slow-wave sleep. CPAP TITRATION STUDY:  The CPAP titration study was started with a CPAP  pressure of 5 cm of water, and the CPAP pressure was gradually increased  to a CPAP pressure of 8 cm of water by titrating to apneas and  hypopneas. At a CPAP pressure of 8 cm of water, the patient spent 1  hour 11 minutes in bed. Out of 1 hour 11 minutes, the patient slept for  a period of 1 hour 5 minutes in non-REM sleep. At a CPAP pressure of 8  cm of water, the patient did not achieve REM sleep; however, the patient  achieved REM sleep at a CPAP pressure of 7 cm of water. At a CPAP  pressure of 8 cm of water, the patient was found to have a total of 2  central apneas and no hypopneas with an apnea-hypopnea index of 1.8. The maximum oxygen desaturation recorded at this pressure was 94% with a  mean oxygen saturation of 96.7%. At a CPAP pressure of 7 cm of water,  the patient spent 3 hours 3 minutes in bed. Out of 3 hours 3 minutes,  the patient slept for a period of 46.3 minutes in REM sleep, 2 hours 3  minutes in non-REM sleep. At a CPAP pressure of 7 cm of water, the  patient had a total of 1 central apnea event and 1 obstructive hypopnea  event with an apnea-hypopnea index of 0.7. The maximum oxygen  desaturation recorded at this pressure was 93% with a mean oxygen  saturation of 96.4%. PERIODIC LIMB MOVEMENT ANALYSIS:  Revealed the patient did not have any  periodic limb movements. The patient had a total of 65 spontaneous  arousals with a spontaneous arousal index of 9.7. EKG MONITORING:  Revealed normal sinus rhythm. IMPRESSION:  1. Moderately severe obstructive sleep apnea with worsening of  respiratory events in REM sleep. The patient had optimal titration to a  CPAP pressure of 7 cm of water with room air. 2.  Hypersomnia by history, most likely due to sleep apnea. 3.  The patient had a good amount of REM sleep during titration study  consistent with REM rebound phenomena. 4.  Bruxism noted during the titration. RECOMMENDATIONS:  1. For the patient's sleep-disordered breathing, we recommend starting  therapy with a CPAP pressure of 7 cm of water with room air. 2.  Specific recommendations include the patient's choice of interface  was DreamWear under the nose mask with a medium headgear and medium wide  cushion. 3.  The patient should be scheduled for a followup with Ms. Alee Gandhi' clinic in six to eight weeks. I recommended CPAP therapy for  clinical reevaluation with review of download. Thanks to Alee Gandhi PA-C, for giving me this opportunity to  participate in the care of this pleasant gentleman.         Navin Fernandes MD    D: 05/06/2021 18:00:23       T: 05/07/2021 5:17:45     [email or web address]  Job#: [id number]     Doc#: [id number]    CC:

## 2021-06-21 ENCOUNTER — TELEPHONE (OUTPATIENT)
Dept: PULMONOLOGY | Age: 46
End: 2021-06-21

## 2021-06-21 NOTE — TELEPHONE ENCOUNTER
I called patient since he no show appt for today, Which patient stated he get is machine on wed June 23rd.  I rescheduled him for Sept.

## 2021-09-09 ENCOUNTER — OFFICE VISIT (OUTPATIENT)
Dept: PULMONOLOGY | Age: 46
End: 2021-09-09
Payer: MEDICARE

## 2021-09-09 VITALS
BODY MASS INDEX: 30.4 KG/M2 | SYSTOLIC BLOOD PRESSURE: 126 MMHG | OXYGEN SATURATION: 99 % | DIASTOLIC BLOOD PRESSURE: 64 MMHG | HEART RATE: 84 BPM | WEIGHT: 229.4 LBS | TEMPERATURE: 97.6 F | HEIGHT: 73 IN

## 2021-09-09 DIAGNOSIS — G47.33 OSA ON CPAP: Primary | ICD-10-CM

## 2021-09-09 DIAGNOSIS — E66.9 OBESITY (BMI 30-39.9): ICD-10-CM

## 2021-09-09 DIAGNOSIS — Z99.89 OSA ON CPAP: Primary | ICD-10-CM

## 2021-09-09 PROCEDURE — 99213 OFFICE O/P EST LOW 20 MIN: CPT | Performed by: PHYSICIAN ASSISTANT

## 2021-09-09 PROCEDURE — 1036F TOBACCO NON-USER: CPT | Performed by: PHYSICIAN ASSISTANT

## 2021-09-09 PROCEDURE — G8427 DOCREV CUR MEDS BY ELIG CLIN: HCPCS | Performed by: PHYSICIAN ASSISTANT

## 2021-09-09 PROCEDURE — G8417 CALC BMI ABV UP PARAM F/U: HCPCS | Performed by: PHYSICIAN ASSISTANT

## 2021-09-09 ASSESSMENT — ENCOUNTER SYMPTOMS
WHEEZING: 0
SHORTNESS OF BREATH: 0
STRIDOR: 0
ALLERGIC/IMMUNOLOGIC NEGATIVE: 1
NAUSEA: 0
EYES NEGATIVE: 1
COUGH: 0
BACK PAIN: 0
CHEST TIGHTNESS: 0
DIARRHEA: 0

## 2021-09-09 NOTE — PROGRESS NOTES
[place] [facility]      Nasreen Stern         [id number]  9/9/2021   Chief Complaint   Patient presents with    Follow-up     CAITY follow up after set up with download         Pt of Dr. Dallas ROGERS Download:   Annie Piña or initial AHI: 17.2      Date of initial study: 3/15/2021      Compliant  27%     Noncompliant 37 %     PAP Type Airsense 10 autoset Level  7   Avg Hrs/Day 3 hr 56 min  AHI: 3.0   Recorded compliance dates , 8/10/2021  to 9/8/2021   Machine/Mfg:   [x] ResMed    [] Respironics/Dreamstation   Interface:   [] Nasal    [] Nasal pillows   [] FFM      Provider:      [] [facility]     [][facility]     [] [employer]    [] Normal    [] [place]               [] [facility]      [] Adaptive    [x] [address]      [] Other    Neck Size: 16.75  Mallampati Mallampati 3  ESS: 2   SAQLI: 97    Here is a scan of the most recent download:            Presentation:   Da Topete presents for sleep medicine follow up for obstructive sleep apnea  Since the last visit, Da Topete is struggling with PAP. He is not getting enough time on PAP. His sleep time is limited. He does not take it with him when he goes out of town. He is more awake and alert when wearing PAP. Equipment issues: The pressure is  acceptable, the mask is acceptable     Sleep issues:  Do you feel better? Yes  More rested? Yes   Better concentration? yes    Progress History:   Since last visit any new medical issues? No  New ER or hospital visits? No  Any new or changes in medicines? No  Any new sleep medicines? No    Review of Systems -   Review of Systems   Constitutional: Negative for activity change, appetite change, chills and fever. HENT: Negative for congestion and postnasal drip. Eyes: Negative. Respiratory: Negative for cough, chest tightness, shortness of breath, wheezing and stridor. Cardiovascular: Negative for chest pain and leg swelling. Gastrointestinal: Negative for diarrhea and nausea. airway pressure therapy with above described pressure. - He  advised to keep good compliance with current recommended pressure to get optimal results and clinical improvement  - Recommend 7-9 hours of sleep with PAP  - He was advised to call [employer] company regarding supplies if needed.   -He call my office for earlier appointment if needed for worsening of sleep symptoms.   - He was instructed on weight loss  - Mina Romo was educated about my impression and plan. Patient verbalizesunderstanding.   We will see Julissa Ho back in: 3 months with download    Information added by my medical assistant/LPN was reviewed today       Alexandria Champagne PA-C, MPAS  9/9/2021

## 2023-07-05 ENCOUNTER — TRANSCRIBE ORDERS (OUTPATIENT)
Dept: ADMINISTRATIVE | Age: 48
End: 2023-07-05

## 2023-07-05 DIAGNOSIS — M25.562 ACUTE PAIN OF LEFT KNEE: Primary | ICD-10-CM

## 2024-08-28 ENCOUNTER — HOSPITAL ENCOUNTER (OUTPATIENT)
Dept: ULTRASOUND IMAGING | Age: 49
Discharge: HOME | End: 2024-08-28
Payer: MEDICAID

## 2024-08-28 DIAGNOSIS — E05.90 THYROTOXICOSIS WITHOUT THYROID STORM, UNSPECIFIED THYROTOXICOSIS TYPE: ICD-10-CM

## 2024-08-28 PROCEDURE — 76536 US EXAM OF HEAD AND NECK: CPT
